# Patient Record
Sex: FEMALE | URBAN - METROPOLITAN AREA
[De-identification: names, ages, dates, MRNs, and addresses within clinical notes are randomized per-mention and may not be internally consistent; named-entity substitution may affect disease eponyms.]

---

## 2017-10-13 ENCOUNTER — TELEPHONE (OUTPATIENT)
Dept: ORTHOPEDICS | Facility: CLINIC | Age: 67
End: 2017-10-13

## 2017-10-13 NOTE — TELEPHONE ENCOUNTER
APPT INFORMATION    Date & Time: 10/16/17 1:30PM    Reason for Appt: Left shoulder pain    Referring Name/Clinic: Self    Yes / No COMMENT / NOTES DATE & ACTION   Patient Contacted? Yes Call was connected from the CC (Nava Mancera). Per appt notes pt has outside recs at [facility] in [place]. Called pt to get HÉCTOR form filled out, spoke with pt who informed me she has never been to [facility] and that her recs are from [place] with Dr. Patel. ([facility]  Emailed HÉCTOR to pt ([email or web address])      RECORDS CLINIC NAME  (use N/A if no records ) DATE & ACTION RECEIVED RECS & IMG? Y/N   (may include other helpful notes)   External Clinics: [facility] [place]  10/13/17, Emailed HÉCTOR  10/13/17, Called and LM for [facility] to return my call regarding pt's recs.   10/13/17, Alea from [facility] called to inform me they do not have this pt in their system.   10/13/17, Received HÉCTOR from pt, faxed cover sheet to [facility] to req. recs STAT!    10/16/17, Faxed cover sheet to [facility] to req. recs   Waiting for OV Notes    Internal Clinics: N/A

## 2017-10-13 NOTE — TELEPHONE ENCOUNTER
Patient requests an appointment with a shoulder surgeon.  She reports she had right rotator cuff repair in the past with Dr Rod and Dr Bridges.  She states she now has left shoulder pain with no known injury.  She reports she has not had recent imaging and has not had surgery on the left side.    She was given the scheduling number to update her demographic information and schedule an appointment.

## 2017-10-16 ENCOUNTER — OFFICE VISIT (OUTPATIENT)
Dept: ORTHOPEDICS | Facility: CLINIC | Age: 67
End: 2017-10-16

## 2017-10-16 ENCOUNTER — PRE VISIT (OUTPATIENT)
Dept: ORTHOPEDICS | Facility: CLINIC | Age: 67
End: 2017-10-16

## 2017-10-16 VITALS — BODY MASS INDEX: 23.79 KG/M2 | WEIGHT: 157 LBS | HEIGHT: 68 IN

## 2017-10-16 DIAGNOSIS — M25.519 SHOULDER PAIN: Primary | ICD-10-CM

## 2017-10-16 DIAGNOSIS — M25.519 CHRONIC SHOULDER PAIN, UNSPECIFIED LATERALITY: Primary | ICD-10-CM

## 2017-10-16 DIAGNOSIS — G89.29 CHRONIC SHOULDER PAIN, UNSPECIFIED LATERALITY: Primary | ICD-10-CM

## 2017-10-16 DIAGNOSIS — S43.429A ROTATOR CUFF (CAPSULE) SPRAIN: Primary | ICD-10-CM

## 2017-10-16 RX ORDER — CELECOXIB 200 MG/1
200 CAPSULE ORAL
COMMUNITY
Start: 2016-11-29 | End: 2017-10-16 | Stop reason: DRUGHIGH

## 2017-10-16 RX ORDER — DULOXETIN HYDROCHLORIDE 60 MG/1
CAPSULE, DELAYED RELEASE ORAL
COMMUNITY
Start: 2016-12-13

## 2017-10-16 RX ORDER — CELECOXIB 100 MG/1
100 CAPSULE ORAL 2 TIMES DAILY
Qty: 60 CAPSULE | Refills: 0 | Status: SHIPPED | OUTPATIENT
Start: 2017-10-16

## 2017-10-16 RX ORDER — TRAMADOL HYDROCHLORIDE 50 MG/1
TABLET ORAL
COMMUNITY
Start: 2017-08-16

## 2017-10-16 RX ORDER — OMEPRAZOLE AND SODIUM BICARBONATE 40; 1100 MG/1; MG/1
1 CAPSULE ORAL
COMMUNITY
Start: 2017-02-21

## 2017-10-16 RX ORDER — ATENOLOL 50 MG/1
50 TABLET ORAL
COMMUNITY
Start: 2017-07-25

## 2017-10-16 RX ORDER — PREGABALIN 100 MG/1
100 CAPSULE ORAL
COMMUNITY
Start: 2017-09-08

## 2017-10-16 RX ORDER — DIPHENOXYLATE HYDROCHLORIDE AND ATROPINE SULFATE 2.5; .025 MG/1; MG/1
1 TABLET ORAL
COMMUNITY
Start: 2016-11-29

## 2017-10-16 RX ORDER — MODAFINIL 200 MG/1
200 TABLET ORAL
COMMUNITY
Start: 2016-11-29

## 2017-10-16 ASSESSMENT — ENCOUNTER SYMPTOMS
CONSTIPATION: 0
FLANK PAIN: 0
HOARSE VOICE: 0
RECTAL BLEEDING: 0
EYE WATERING: 1
NAUSEA: 0
HEMATURIA: 0
SORE THROAT: 0
BLOOD IN STOOL: 0
HEARTBURN: 1
EYE PAIN: 1
BACK PAIN: 1
SMELL DISTURBANCE: 0
TROUBLE SWALLOWING: 1
MUSCLE CRAMPS: 1
DOUBLE VISION: 0
NECK MASS: 0
TASTE DISTURBANCE: 0
EYE IRRITATION: 1
DIFFICULTY URINATING: 0
JAUNDICE: 0
DIARRHEA: 0
MYALGIAS: 0
MUSCLE WEAKNESS: 0
JOINT SWELLING: 0
ABDOMINAL PAIN: 0
VOMITING: 0
BOWEL INCONTINENCE: 0
RECTAL PAIN: 0
NECK PAIN: 1
DYSURIA: 0
SINUS PAIN: 0
STIFFNESS: 1
SINUS CONGESTION: 0
EYE REDNESS: 0
ARTHRALGIAS: 1
BLOATING: 1

## 2017-10-16 NOTE — LETTER
10/16/2017       RE: Jessica Judge  [address] [address]     Dear Colleague,    Thank you for referring your patient, Jessica Judge, to the [facility] at [facility]. Please see a copy of my visit note below.    REASON FOR CONSULTATION:  Left shoulder pain.      HISTORY OF PRESENT ILLNESS:  Jessica Judge is an otherwise healthy 67-year-old female who presents for evaluation of acute on chronic left shoulder pain.  Of note, the patient has a history of right open rotator cuff repair performed in 2009 by Dr. Du Rod.  She has done well from this.  The patient reports mild discomfort about the left shoulder until approximately 3 weeks ago when she was training for golf and noted acute increase in pain.  Since that time she has had difficulty with any overhead activities of the left, nondominant hand.  She has curtailed her level of activities on account of this pain.  She notes no other discrete injuries, weaknesses or dysesthesias other than the aforementioned.      PAST MEDICAL HISTORY:   1.  Hypertension.   2.  Cervical stenosis.      PAST SURGICAL HISTORY:     1.  08/25/2009, open right rotator cuff repair by Dr. Du Rod.   2.  10/1999, right shoulder arthroscopic subacromial decompression performed by Dr. Nathan Bridges.      SOCIAL HISTORY:  The patient currently lives with her  in [place].  They winter in [place], [place].  She enjoys many outdoor activities including golfing, hiking and kayaking and formally enjoyed tennis, is a lifelong nonsmoker.  She has 4 grandchildren under the age of 7.      PHYSICAL EXAMINATION:  The patient is a healthy and well-appearing 67-year-old female.  She is articulate, freely conversant and in no acute distress.  She exhibits a euthymic mood, situation-appropriate mood congruent affect.   She exhibits a nonlabored breathing pattern on room air.   Focused examination of the  patient's left upper extremity is notable for a mild supraspinatus atrophy relative to the contralateral side.  There is a large keloid like scar on the lateral aspect of the left shoulder just distal to the acromion.  She is tender over the supraspinatus and to a lesser extent over the biceps tendon.  She has a positive Speed's, positive empty can, negative belly press, shoulder range of motion is to 150 degrees of forward elevation, 45 degrees of external rotation, and internal rotation to T-12.  She endorses symmetric, sensation intact to light touch throughout the axillary, radial, median and ulnar nerve distributions in addition to the medial and lateral antebrachial cutaneous nerve distributions.  She has 4/5 strength with forward elevation and external rotation.      IMAGING:  Reviewed the patient's MRI from 2010 as well as left shoulder radiographs is notable for minimal if any joint space narrowing, no acute osseous abnormalities.  The review of the 2010 MRI is notable for a partial thickness supraspinatus tear.      ASSESSMENT:  67-year-old female with likely left shoulder rotator cuff tear.      PLAN:  The clinical and MRI findings were discussed with Ms. Judge.  At this juncture, we would recommend obtaining MRI to further characterize her lesion.  Should this reveal a progression of her previously documented tear in 2010, consideration would be given towards arthroscopic rotator cuff repair.  The patient will obtain an MRI and will follow up in 1 week to discuss the results thereof.  The patient's questions were answered to her satisfaction.  She is amenable to the plan as stated above.      The aforementioned plan was formulated with Dr. Orlando Diaz who is the physician of record for this encounter.       Again, thank you for allowing me to participate in the care of your patient.      Sincerely,    Orlando Diaz MD
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
## 2017-10-16 NOTE — TELEPHONE ENCOUNTER
Phone Call:    Who did you talk to? (or) Who did you call? Called [facility].   Call Detail/Action: Left a detailed message for Jeri from Medical Records @ [facility] to fax pt's recs. Re-faxed cover sheet along with signed HÉCTOR to [facility] fax number [phone].

## 2017-10-16 NOTE — MR AVS SNAPSHOT
After Visit Summary   10/16/2017    Jessica Judge    MRN: [id number]           Patient Information     Date Of Birth          1950        Visit Information        Provider Department      10/16/2017 1:30 PM Orlando Diaz MD [facility]        Today's Diagnoses     Chronic shoulder pain, unspecified laterality    -  1       Follow-ups after your visit        Your next 10 appointments already scheduled     2017 10:10 AM CST   (Arrive by 9:55 AM)   RETURN SHOULDER with Orlando Diaz MD   [facility] ([facility])    [address] [place] [id number] [phone]              Who to contact     Please call your clinic at [phone] to:    Ask questions about your health    Make or cancel appointments    Discuss your medicines    Learn about your test results    Speak to your doctor   If you have compliments or concerns about an experience at your clinic, or if you wish to file a complaint, please contact [facility] Patient Relations at [phone] or email us at [email or web address]         Additional Information About Your Visit        MyChart Information     [employer] is an electronic gateway that provides easy, online access to your medical records. With [employer], you can request a clinic appointment, read your test results, renew a prescription or communicate with your care team.     To sign up for [employer] visit the website at [email or web address]   You will be asked to enter the access code listed below, as well as some personal information. Please follow the directions to create your username and password.     Your access code is: [email or web address]  Expires: 2018  6:31 AM     Your access code will  in 90 days. If you need help or a new code, please contact your [facility] or call [phone] for  "assistance.        Care EveryWhere ID     This is your Care EveryWhere ID. This could be used by other organizations to access your [facility] medical records  [id number]        Your Vitals Were     Height BMI (Body Mass Index)                1.715 m (5' 7.5\") 24.23 kg/m2           Blood Pressure from Last 3 Encounters:   No data found for BP    Weight from Last 3 Encounters:   10/16/17 71.2 kg (157 lb)                 Today's Medication Changes          These changes are accurate as of: 10/16/17 11:59 PM.  If you have any questions, ask your nurse or doctor.               These medicines have changed or have updated prescriptions.        Dose/Directions    celecoxib 100 MG capsule   Commonly known as:  celeBREX   This may have changed:    - medication strength  - how much to take  - when to take this   Used for:  Rotator cuff (capsule) sprain   Changed by:  Orlando Diaz MD        Dose:  100 mg   Take 1 capsule (100 mg) by mouth 2 times daily   Quantity:  60 capsule   Refills:  0            Where to get your medicines      These medications were sent to [facility] [id number] [facility] [employer] - [facility] [address] [place] [address]  [address] [facility] [address]     Phone:  [phone]     celecoxib 100 MG capsule                Primary Care Provider Office Phone # Fax #    Chikis Freeman [phone]       [facility] [address] [facility] [address]  [facility] [address]        Equal Access to Services     [facility] AH: Hadii volodymyr campuzano hadasho Soomaali, waaxda luqadaha, qaybta kaalmada adeegyada, ute lombardo . So [facility] [phone].    ATENCIÓN: Si habla español, tiene a xie disposición servicios gratuitos de asistencia lingüística. Pierre al [phone].    We comply with applicable federal civil rights laws and [place] laws. We do not discriminate on the basis of race, color, national origin, age, disability, sex, sexual orientation, or gender identity.            Thank you!     " Thank you for choosing [facility]  for your care. Our goal is always to provide you with excellent care. Hearing back from our patients is one way we can continue to improve our services. Please take a few minutes to complete the written survey that you may receive in the mail after your visit with us. Thank you!             Your Updated Medication List - Protect others around you: Learn how to safely use, store and throw away your medicines at www.disposemymeds.org.          This list is accurate as of: 10/16/17 11:59 PM.  Always use your most recent med list.                   Brand Name Dispense Instructions for use Diagnosis    atenolol 50 MG tablet    TENORMIN     Take 50 mg by mouth        celecoxib 100 MG capsule    celeBREX    60 capsule    Take 1 capsule (100 mg) by mouth 2 times daily    Rotator cuff (capsule) sprain       DULoxetine 60 MG EC capsule    CYMBALTA          melatonin 5 MG Caps      Take 5 mg by mouth        modafinil 200 MG tablet    PROVIGIL     Take 200 mg by mouth        MULTI-VITAMINS Tabs      Take 1 tablet by mouth        omeprazole-sodium bicarbonate  MG per capsule    ZEGERID     Take 1 capsule by mouth        prednisolone 0.25% and hyaluronate in balanced salt Susp compounded ophthalmic suspension      1 Drop in both eyes 4x/day x 1 wk then 3x/day x 1 wk then 2x/day until re-check. Discard 15 days after opening. Refrigerate. Unopened exp:        pregabalin 100 MG capsule    LYRICA     Take 100 mg by mouth        RA BIOTIN 2.5 MG Caps   Generic drug:  biotin      Take 2,500 mcg by mouth        traMADol 50 MG tablet    ULTRAM

## 2017-10-16 NOTE — PROGRESS NOTES
REASON FOR CONSULTATION:  Left shoulder pain.      HISTORY OF PRESENT ILLNESS:  Jessica Judge is an otherwise healthy 67-year-old female who presents for evaluation of acute on chronic left shoulder pain.  Of note, the patient has a history of right open rotator cuff repair performed in 2009 by Dr. Du Rod.  She has done well from this.  The patient reports mild discomfort about the left shoulder until approximately 3 weeks ago when she was training for golf and noted acute increase in pain.  Since that time she has had difficulty with any overhead activities of the left, nondominant hand.  She has curtailed her level of activities on account of this pain.  She notes no other discrete injuries, weaknesses or dysesthesias other than the aforementioned.      PAST MEDICAL HISTORY:   1.  Hypertension.   2.  Cervical stenosis.      PAST SURGICAL HISTORY:     1.  08/25/2009, open right rotator cuff repair by Dr. Du Rod.   2.  10/1999, right shoulder arthroscopic subacromial decompression performed by Dr. Nathan Bridges.      SOCIAL HISTORY:  The patient currently lives with her  in [place].  They winter in [place], [place].  She enjoys many outdoor activities including golfing, hiking and kayaking and formally enjoyed tennis, is a lifelong nonsmoker.  She has 4 grandchildren under the age of 7.      PHYSICAL EXAMINATION:  The patient is a healthy and well-appearing 67-year-old female.  She is articulate, freely conversant and in no acute distress.  She exhibits a euthymic mood, situation-appropriate mood congruent affect.   She exhibits a nonlabored breathing pattern on room air.   Focused examination of the patient's left upper extremity is notable for a mild supraspinatus atrophy relative to the contralateral side.  There is a large keloid like scar on the lateral aspect of the left shoulder just distal to the acromion.  She is tender over the supraspinatus and to a lesser extent over the biceps  tendon.  She has a positive Speed's, positive empty can, negative belly press, shoulder range of motion is to 150 degrees of forward elevation, 45 degrees of external rotation, and internal rotation to T-12.  She endorses symmetric, sensation intact to light touch throughout the axillary, radial, median and ulnar nerve distributions in addition to the medial and lateral antebrachial cutaneous nerve distributions.  She has 4/5 strength with forward elevation and external rotation.      IMAGING:  Reviewed the patient's MRI from 2010 as well as left shoulder radiographs is notable for minimal if any joint space narrowing, no acute osseous abnormalities.  The review of the 2010 MRI is notable for a partial thickness supraspinatus tear.      ASSESSMENT:  67-year-old female with likely left shoulder rotator cuff tear.      PLAN:  The clinical and MRI findings were discussed with Ms. Judge.  At this juncture, we would recommend obtaining MRI to further characterize her lesion.  Should this reveal a progression of her previously documented tear in 2010, consideration would be given towards arthroscopic rotator cuff repair.  The patient will obtain an MRI and will follow up in 1 week to discuss the results thereof.  The patient's questions were answered to her satisfaction.  She is amenable to the plan as stated above.      The aforementioned plan was formulated with Dr. Orlando Diaz who is the physician of record for this encounter.       Answers for HPI/ROS submitted by the patient on 10/16/2017   General Symptoms: No  Skin Symptoms: No  HENT Symptoms: Yes  EYE SYMPTOMS: Yes  HEART SYMPTOMS: No  LUNG SYMPTOMS: No  INTESTINAL SYMPTOMS: Yes  URINARY SYMPTOMS: Yes  GYNECOLOGIC SYMPTOMS: No  BREAST SYMPTOMS: No  SKELETAL SYMPTOMS: Yes  BLOOD SYMPTOMS: No  NERVOUS SYSTEM SYMPTOMS: No  MENTAL HEALTH SYMPTOMS: No  Ear pain: No  Ear discharge: No  Hearing loss: No  Tinnitus: Yes  Nosebleeds: No  Congestion: No  Sinus pain:  No  Trouble swallowing: Yes   Voice hoarseness: No  Mouth sores: No  Sore throat: No  Tooth pain: No  Gum tenderness: No  Bleeding gums: No  Change in taste: No  Change in sense of smell: No  Dry mouth: No  Hearing aid used: No  Neck lump: No  Eye pain: Yes  Vision loss: No  Dry eyes: Yes  Watery eyes: Yes  Eye bulging: No  Double vision: No  Flashing of lights: No  Spots: No  Floaters: No  Redness: No  Crossed eyes: No  Tunnel Vision: No  Yellowing of eyes: No  Eye irritation: Yes  Heart burn or indigestion: Yes  Nausea: No  Vomiting: No  Abdominal pain: No  Bloating: Yes  Constipation: No  Diarrhea: No  Blood in stool: No  Black stools: No  Rectal or Anal pain: No  Fecal incontinence: No  Rectal bleeding: No  Yellowing of skin or eyes: No  Vomit with blood: No  Change in stools: No  Hemorrhoids: No  Trouble holding urine or incontinence: No  Pain or burning: No  Trouble starting or stopping: Yes  Increased frequency of urination: No  Blood in urine: No  Decreased frequency of urination: No  Frequent nighttime urination: No  Flank pain: No  Difficulty emptying bladder: No  Back pain: Yes  Muscle aches: No  Neck pain: Yes  Swollen joints: No  Joint pain: Yes  Bone pain: No  Muscle cramps: Yes  Muscle weakness: No  Joint stiffness: Yes  Bone fracture: No  I saw the patient with the resident.  I agree with the resident note and plan of care.      Orlando Diaz MD

## 2017-10-16 NOTE — TELEPHONE ENCOUNTER
Records Received From:  [facility]     Date/Exam/Location  (specify location if different)   Office Notes:  2/17/11, 8/9/00, 5/28/09, 6/3/09, 9/3/09, 10/6/09, 10/13/09, 1/6/10, 4/29/10   Radiology Reports:  MRI left shoulder 4/29/10- [facility] mya simmons  MRI right shoulder 5/29/09-[facility]    Veronica Collins Notified (Y/N):    Procedure Notes:  (include injections)  -open revision rotator cuff repair, acromiclavicular joint resection, coracoid decompression, biceps tenodesis 8/25/09- Dr. Rod

## 2017-10-20 NOTE — TELEPHONE ENCOUNTER
Received Imaging From:     Image Type (x): Disc:_X____    Pacs:_____      Exam Date/Name:  Comments: Received 1 disc from [facility], will send to [facility].

## 2017-10-31 ENCOUNTER — TELEPHONE (OUTPATIENT)
Dept: ORTHOPEDICS | Facility: CLINIC | Age: 67
End: 2017-10-31

## 2017-10-31 NOTE — TELEPHONE ENCOUNTER
Left shoulder MRI reviewed by Dr Diaz.  Patient informed there has been progression of the rotator cuff tear.  She could consider surgical repair.  Patient verbalized understanding.  She stated she will call back to discuss a surgical date.

## 2017-11-01 ENCOUNTER — TELEPHONE (OUTPATIENT)
Dept: ORTHOPEDICS | Facility: CLINIC | Age: 67
End: 2017-11-01

## 2017-11-01 NOTE — TELEPHONE ENCOUNTER
Patient has decided to cancel surgery and do therapy for her rotator cuff tear.  She plans to follow-up with Dr Rod, the surgeon who did her previous shoulder surgeries.  Dr Diaz and the surgery coordinator will be informed.

## 2017-11-03 NOTE — TELEPHONE ENCOUNTER
Note:   Received additional recs from [facility] - will scan and forward to Clinic.

## 2017-11-13 DIAGNOSIS — S43.429A ROTATOR CUFF (CAPSULE) SPRAIN: ICD-10-CM

## 2017-11-13 RX ORDER — CELECOXIB 100 MG/1
CAPSULE ORAL
Qty: 60 CAPSULE | Refills: 0 | OUTPATIENT
Start: 2017-11-13